# Patient Record
(demographics unavailable — no encounter records)

---

## 2024-11-27 NOTE — DISCUSSION/SUMMARY
[FreeTextEntry1] : 35-year-old G0 here with vaginitis  Impression: Bacterial vaginosis, Rx p.o. Flagyl given.  Affirm collected.  GC negative with meMay 2024, patient states to recheck even though she is with the same partner.  Contraception: Patient currently using pullout method.  Advised risk of unintended pregnancy with person who she does not want to have a baby with.  Recommend condoms.  Condoms would protect her from pH imbalance and also prevent undesired pregnancy.  udip notable for glucosuria and blood.  Recommend controlling diabetes to decrease risk of infections.  Urine culture sent  23-minute consultation

## 2024-11-27 NOTE — PHYSICAL EXAM
[TextEntry] : General appearance well-appearing no acute distress  Normal external genitalia, no external erythema  Speculum inserted thin white discharge in vagina no lesions no abnormal discharge cervix appears closed no lesions.   Bimanual exam performed. Anteverted uterus nontender no cervical motion tenderness no adnexal masses bilaterally, no adnexal tenderness bilaterally

## 2024-11-27 NOTE — HISTORY OF PRESENT ILLNESS
[FreeTextEntry1] : 36 yo G0 here for r/o vaginitis Had sex with partner 11/14, 2 days later started having vaginitis symptoms No sex for 40 days, and had sex with same partner, patient states he always messes with her pH, does not use condoms, but uses pullout.  Does not use contraception, she "prays" to not get pregnant.  After intercourse she had symptoms of  yeast infection, called and I Rx'd Diflucan which she took last week.  Symptoms improved a little, not itching as much.  feels wet since last week also.  Sees some white discharge.  No fevers or chills.  no bleeding has odor, which is the most bothersome No dysuria.  But states has warmth when she pees  [PapSmeardate] : May 2024 [LMPDate] : 11/04/24

## 2025-04-02 NOTE — HISTORY OF PRESENT ILLNESS
[FreeTextEntry1] : This is a 35-year-old G0 who presents for possible vaginitis  Patient started having symptoms about 5 days ago.  Feels irritated on the outside of her labia, no internal vaginal itching.  Recently had a new partner (the day after symptoms started she had sex with new partner, ie symptoms started before intercourse with new partner ), patient used pullout and also took Plan B the day after.  Patient states she has some white discharge, does not have any internal itching, no odor.  No fevers no chills.  Has been on Jardiance for many years, most recent hemoglobin A1c was 7.1 or 7.3, which is improved for her.  She missed her last appointment.  Denies dysuria and urinary frequency, otherwise feeling well.  Has not checked her fingersticks in a long time

## 2025-04-02 NOTE — PHYSICAL EXAM
[TextEntry] : External erythema noted on medial side of bilateral labia, no other lesions noted on external genitalia  Speculum inserted thin dry appearing white discharge noted, no blood, no lesions.

## 2025-04-02 NOTE — PLAN
[FreeTextEntry1] : 35-year-old vaginitis  Vaginal cultures obtained.  Will treat for yeast infection now with external clotrimazole cream.  Discussed that control of her diabetes will improve frequency of vaginal infections.  Advised for patient to follow-up with her doctor since glucosuria was noted today.  Will call with culture results.  Also advised condoms!

## 2025-04-25 NOTE — PHYSICAL EXAM
[TextEntry] : External genitalia very erythematous and thickened bilaterally  Speculum inserted dry, thin white discharge noted culture obtained

## 2025-04-25 NOTE — DISCUSSION/SUMMARY
[FreeTextEntry1] : 35-year-old with acute yeast infection following p.o. Flagyl  Discussed with patient yeast infections can frequently follow oral antibiotic use. - Discussed starting probiotic like my Happy V, which has been shown to potentially decrease risk of BV.  Patient does not have a current partner at this time, there is evidence to show treatment of partner for recurrent BV is useful this was discussed with patient. - acute yeast infection: cultures drawn. Diflucan x2 - DM: undergoing tx with endo, may try ozempic.  Needs annual May

## 2025-04-25 NOTE — HISTORY OF PRESENT ILLNESS
[FreeTextEntry1] : here for f/u of BV recently treated with PO flagyl for cx+ BV, after finished had clumpy yellow discharge. still has itching externally, but stopped using the cream I gave her because I told her to only use it for 2 weeks had BW with endocrine, getting worked up for possible Ozempic due to diabetes not recently sexually active.

## 2025-04-27 NOTE — HISTORY OF PRESENT ILLNESS
[FreeTextEntry1] : Here for CPE Seeing GYN regarding multiple yeast infections [de-identified] : Never a smoker. Social alcohol. Last GYN appt last week. Has a follow up appt with GYN today Exercises 3 times per week.

## 2025-04-27 NOTE — HEALTH RISK ASSESSMENT
[Good] : ~his/her~ current health as good [Excellent] : ~his/her~  mood as  excellent [Yes] : Yes [Former] : Former [0-4] : 0-4 [< 15 Years] : < 15 Years [de-identified] : social

## 2025-04-27 NOTE — HISTORY OF PRESENT ILLNESS
[FreeTextEntry1] : Here for CPE Seeing GYN regarding multiple yeast infections [de-identified] : Never a smoker. Social alcohol. Last GYN appt last week. Has a follow up appt with GYN today Exercises 3 times per week.

## 2025-04-27 NOTE — HEALTH RISK ASSESSMENT
[Good] : ~his/her~ current health as good [Excellent] : ~his/her~  mood as  excellent [Yes] : Yes [Former] : Former [0-4] : 0-4 [< 15 Years] : < 15 Years [de-identified] : social

## 2025-05-12 NOTE — PHYSICAL EXAM
[Alert] : alert [Well Nourished] : well nourished [EOMI] : extra ocular movement intact [Normal Hearing] : hearing was normal [No Respiratory Distress] : no respiratory distress [No Accessory Muscle Use] : no accessory muscle use [Normal Rate] : heart rate was normal [Normal Gait] : normal gait [Right foot was examined, including] : right foot ~C was examined, including visual inspection with sensory and pulse exams [Left foot was examined, including] : left foot ~C was examined, including visual inspection with sensory and pulse exams [No Tremors] : no tremors [Oriented x3] : oriented to person, place, and time [Normal Affect] : the affect was normal [Normal Mood] : the mood was normal

## 2025-05-12 NOTE — HISTORY OF PRESENT ILLNESS
[FreeTextEntry1] :  is presenting to establish care for her Diabetes.   35 year old female with PMH of Type 2 Diabetes since 2019, BMI 28, Not TTC.  FH positive for Diabetes in mother and father.   Reports no microvascular complications, no history of retinopathy, nephropathy or neuropathy.  Reports no history of cardiovascular risk factors, no history of CAD, CHF or CVA in the past.   Current DM meds: Jardiance 10mg (Multiple yeast infections, bacterial vaginosis) Prior DM meds: Metformin (Severe GI side effects) Other pertinent meds: 167 (fasting)  POCT A1c%: 8.7% April 2025  POCT BG:   FSG: Does not check BG.  Pre-Breakfast:  Pre-Lunch:  Pre-Dinner:  Bedtime:  Hypoglycemic episodes:   Diet:  Breakfast: Eggs, avocado, ham.  Lunch: chicken wrap, salad Dinner: Meat, veggies.  Snacks: No Soda, Has regular sugar green tea, rarely juice. Fruit: Mangoes, pineapples, berries, banana, grapes. Has tequila weekly.   Exercise: 3x/week. 15-20 minutes (squats, arm press). Has peloton bike at home.  Urine micro: Needs ACE: C-peptide:  Cr: 0.64 GFR: 118  Lipid profile: TGs 69  April 2025 Statin:  HbA1c%: 8.7%  Ophthalmology: 2024, No DR.  Neuropathy: None.  Podiatry/Diabetic foot exam:

## 2025-05-12 NOTE — ASSESSMENT
[Diabetes Foot Care] : diabetes foot care [Long Term Vascular Complications] : long term vascular complications of diabetes [Carbohydrate Consistent Diet] : carbohydrate consistent diet [Importance of Diet and Exercise] : importance of diet and exercise to improve glycemic control, achieve weight loss and improve cardiovascular health [Exercise/Effect on Glucose] : exercise/effect on glucose [Injection Technique, Storage, Sharps Disposal] : injection technique, storage, and sharps disposal [Retinopathy Screening] : Patient was referred to ophthalmology for retinopathy screening [FreeTextEntry1] : 35 year old female with PMH of Type 2 Diabetes since 2019, BMI 28, Not TTC is presenting to establish care for her Diabetes.   1. Type 2 diabetes mellitus. Point-of-care HbA1c 8.7% April 2025 and blood glucose 167 mg/dL today.  -We discussed the cardiovascular and microvascular complications of uncontrolled diabetes. We discussed the importance of diet and exercise and lifestyle modification for glycemic control and weight loss. We discussed referral to our diabetes educator and nutrition. We discussed pharmacologic options for glycemic control and weight loss. -Patient does not want to lose weight but we discussed she should not continue Jardiance any further given h/o multiple UTIs and yeast infections.  -Start Ozempic 0.25mg weekly for 4 weeks, then increase to 0.5 mg weekly. No h/o MTC, MEN2 or pancreatitis. GI s/e discussed. -Check SMBG. Try for karsten 3 sensor.  -Urine ACR Needs today.  -Opthal and Foot care discussed -, above goal. Discussed diet and alcohol minimizing.   Patient verbalized understanding of the above. All questions were answered to patient's satisfaction. Dispo: Patient will follow up in 3 months.